# Patient Record
Sex: FEMALE | Race: WHITE | URBAN - METROPOLITAN AREA
[De-identification: names, ages, dates, MRNs, and addresses within clinical notes are randomized per-mention and may not be internally consistent; named-entity substitution may affect disease eponyms.]

---

## 2019-01-21 ENCOUNTER — HOSPITAL ENCOUNTER (EMERGENCY)
Age: 59
Discharge: HOME | End: 2019-01-21
Attending: EMERGENCY MEDICINE
Payer: COMMERCIAL

## 2019-01-21 VITALS
TEMPERATURE: 98.2 F | SYSTOLIC BLOOD PRESSURE: 169 MMHG | WEIGHT: 259.92 LBS | BODY MASS INDEX: 41.77 KG/M2 | OXYGEN SATURATION: 96 % | HEIGHT: 66 IN | HEART RATE: 116 BPM | DIASTOLIC BLOOD PRESSURE: 108 MMHG | RESPIRATION RATE: 16 BRPM

## 2019-01-21 DIAGNOSIS — S61.412A LACERATION OF LEFT PALM, INITIAL ENCOUNTER: Primary | ICD-10-CM

## 2019-01-21 PROCEDURE — 99282 EMERGENCY DEPT VISIT SF MDM: CPT

## 2019-01-21 PROCEDURE — 4500000022 HC ED LEVEL 2 PROCEDURE

## 2019-01-21 ASSESSMENT — PAIN SCALES - GENERAL: PAINLEVEL_OUTOF10: 0

## 2024-11-21 ENCOUNTER — OFFICE VISIT (OUTPATIENT)
Dept: ENT CLINIC | Age: 64
End: 2024-11-21
Payer: COMMERCIAL

## 2024-11-21 VITALS
OXYGEN SATURATION: 97 % | BODY MASS INDEX: 36.54 KG/M2 | WEIGHT: 214 LBS | SYSTOLIC BLOOD PRESSURE: 138 MMHG | HEIGHT: 64 IN | DIASTOLIC BLOOD PRESSURE: 87 MMHG | HEART RATE: 102 BPM

## 2024-11-21 DIAGNOSIS — K11.8 MASS OF RIGHT SUBMANDIBULAR GLAND: ICD-10-CM

## 2024-11-21 DIAGNOSIS — H60.8X2 CHRONIC ECZEMATOUS OTITIS EXTERNA OF LEFT EAR: Primary | ICD-10-CM

## 2024-11-21 DIAGNOSIS — Z90.49 HISTORY OF PAROTIDECTOMY: ICD-10-CM

## 2024-11-21 PROCEDURE — 1036F TOBACCO NON-USER: CPT | Performed by: STUDENT IN AN ORGANIZED HEALTH CARE EDUCATION/TRAINING PROGRAM

## 2024-11-21 PROCEDURE — 1123F ACP DISCUSS/DSCN MKR DOCD: CPT | Performed by: STUDENT IN AN ORGANIZED HEALTH CARE EDUCATION/TRAINING PROGRAM

## 2024-11-21 PROCEDURE — 1090F PRES/ABSN URINE INCON ASSESS: CPT | Performed by: STUDENT IN AN ORGANIZED HEALTH CARE EDUCATION/TRAINING PROGRAM

## 2024-11-21 PROCEDURE — G8427 DOCREV CUR MEDS BY ELIG CLIN: HCPCS | Performed by: STUDENT IN AN ORGANIZED HEALTH CARE EDUCATION/TRAINING PROGRAM

## 2024-11-21 PROCEDURE — G8419 CALC BMI OUT NRM PARAM NOF/U: HCPCS | Performed by: STUDENT IN AN ORGANIZED HEALTH CARE EDUCATION/TRAINING PROGRAM

## 2024-11-21 PROCEDURE — 3017F COLORECTAL CA SCREEN DOC REV: CPT | Performed by: STUDENT IN AN ORGANIZED HEALTH CARE EDUCATION/TRAINING PROGRAM

## 2024-11-21 PROCEDURE — 99204 OFFICE O/P NEW MOD 45 MIN: CPT | Performed by: STUDENT IN AN ORGANIZED HEALTH CARE EDUCATION/TRAINING PROGRAM

## 2024-11-21 PROCEDURE — G8484 FLU IMMUNIZE NO ADMIN: HCPCS | Performed by: STUDENT IN AN ORGANIZED HEALTH CARE EDUCATION/TRAINING PROGRAM

## 2024-11-21 PROCEDURE — G8400 PT W/DXA NO RESULTS DOC: HCPCS | Performed by: STUDENT IN AN ORGANIZED HEALTH CARE EDUCATION/TRAINING PROGRAM

## 2024-11-21 RX ORDER — FLUOCINOLONE ACETONIDE 0.11 MG/ML
OIL AURICULAR (OTIC)
Qty: 20 ML | Refills: 0 | Status: SHIPPED | OUTPATIENT
Start: 2024-11-21

## 2024-11-21 NOTE — PROGRESS NOTES
history.    Patient has a history of what sounds like a superficial parotidectomy approximately 15 years ago.  She does not recall what the pathology was, only that it was benign.  She did not have any significant follow-up or repeat imaging.  She now has greater than a year history of a right neck mass anterior to where her previous mass was.  She describes in the submandibular gland region.  She does not feel it is growing.  She has no pain.  No facial nerve weakness or systemic symptoms.          REVIEW OF SYSTEMS  The following systems were reviewed and revealed the following in addition to any already discussed in the HPI:    PHYSICAL EXAM    GENERAL: No acute distress, alert and oriented, no hoarseness, strong voice  EYES: EOMI, Anti-icteric  HENT:   Head: Normocephalic and atraumatic.   Face:  Symmetric, facial nerve intact  Right Ear: Normal external ear, normal external auditory canal, intact tympanic membrane with normal mobility and aerated middle ear  Left Ear: Normal external ear, normal external auditory canal, intact tympanic membrane with normal mobility and aerated middle ear mild inflammation of the distal external auditory canal consistent with chronic eczematous otitis externa  Mouth/Oral Cavity:  normal lips, Uvula is midline, no mucosal lesions, no trismus, unremarkable dentition, normal salivary quality/flow  Oropharynx/Larynx:  normal oropharynx, 1+ tonsils  Nose:Normal external nasal appearance.  Normal mucosa   NECK: Normal range of motion, no thyromegaly, trachea is midline, no lymphadenopathy, no neck masses, no crepitus  CHEST: Normal respiratory effort, no retractions, breathing comfortably  SKIN: No rashes, normal appearing skin, no evidence of skin lesions/tumors  Neuro:  cranial nerve II-XII intact; normal gait  Cardio:  no edema        PROCEDURE      This note was generated completely or in part utilizing Dragon dictation speech recognition software.  Occasionally, words are

## 2025-01-07 NOTE — PROGRESS NOTES
[facility] PRE-OPERATIVE INSTRUCTIONS    Day of Procedure:   1/14/25             Arrival time:  0900              Surgery time: 1000    Take the following medications with a sip of water:  Follow your MD/Surgeons pre-procedure instructions regarding your medications     Do not eat or drink anything after 12:00 midnight prior to your surgery.  This includes water, chewing gum, mints and ice chips.   You may brush your teeth and gargle the morning of your surgery, but do not swallow the water. Except prep     Please see your family doctor/pediatrician for a history and physical and/or concerning medications. N/A   Bring any test results/reports from your physicians office.   If you are under the care of a heart doctor or specialist doctor, please be aware that you may be asked to see them for clearance.    You may be asked to stop blood thinners such as Coumadin, Plavix, Fragmin, Lovenox, etc., or any anti-inflammatories such as:  Aspirin, Ibuprofen, Advil, Naproxen prior to your surgery.    We also ask that you stop any over the counter medications such as fish oil, vitamin E, glucosamine, garlic, Multivitamins, COQ 10, etc.    We ask that you do not smoke 24 hours prior to surgery.  We ask that you do not  drink any alcoholic beverages 24 hours prior to surgery     You must make arrangements for a responsible adult to take you home after your surgery.    For your safety, you will not be allowed to leave alone or drive yourself home.  Your surgery will be cancelled if you do not have a ride home.     Also for your safety, you must have someone stay with you the first 24 hours after your surgery.     A parent or legal guardian must accompany a child scheduled for surgery and plan to stay at the hospital until the child is discharged.    Please do not bring other children with you.    For your comfort, please wear simple loose fitting clothing to the hospital.  Please do not bring valuables.    Do not wear

## 2025-01-14 ENCOUNTER — HOSPITAL ENCOUNTER (OUTPATIENT)
Age: 65
Setting detail: OUTPATIENT SURGERY
Discharge: HOME | End: 2025-01-14
Attending: INTERNAL MEDICINE | Admitting: INTERNAL MEDICINE
Payer: COMMERCIAL

## 2025-01-14 ENCOUNTER — ANESTHESIA EVENT (OUTPATIENT)
Dept: ENDOSCOPY | Age: 65
End: 2025-01-14
Payer: COMMERCIAL

## 2025-01-14 ENCOUNTER — ANESTHESIA (OUTPATIENT)
Dept: ENDOSCOPY | Age: 65
End: 2025-01-14
Payer: COMMERCIAL

## 2025-01-14 ENCOUNTER — APPOINTMENT (OUTPATIENT)
Dept: ENDOSCOPY | Age: 65
End: 2025-01-14
Attending: INTERNAL MEDICINE
Payer: COMMERCIAL

## 2025-01-14 VITALS
HEART RATE: 86 BPM | WEIGHT: 216 LBS | HEIGHT: 64 IN | BODY MASS INDEX: 36.88 KG/M2 | TEMPERATURE: 96.4 F | OXYGEN SATURATION: 95 % | DIASTOLIC BLOOD PRESSURE: 67 MMHG | SYSTOLIC BLOOD PRESSURE: 137 MMHG | RESPIRATION RATE: 16 BRPM

## 2025-01-14 DIAGNOSIS — Z12.11 COLON CANCER SCREENING: ICD-10-CM

## 2025-01-14 LAB
GLUCOSE BLD-MCNC: 174 MG/DL (ref 70–99)
PERFORMED ON: ABNORMAL

## 2025-01-14 PROCEDURE — 3700000000 HC ANESTHESIA ATTENDED CARE: Performed by: INTERNAL MEDICINE

## 2025-01-14 PROCEDURE — 3609010600 HC COLONOSCOPY POLYPECTOMY SNARE/COLD BIOPSY: Performed by: INTERNAL MEDICINE

## 2025-01-14 PROCEDURE — 88305 TISSUE EXAM BY PATHOLOGIST: CPT

## 2025-01-14 PROCEDURE — 7100000011 HC PHASE II RECOVERY - ADDTL 15 MIN: Performed by: INTERNAL MEDICINE

## 2025-01-14 PROCEDURE — 2709999900 HC NON-CHARGEABLE SUPPLY: Performed by: INTERNAL MEDICINE

## 2025-01-14 PROCEDURE — 7100000010 HC PHASE II RECOVERY - FIRST 15 MIN: Performed by: INTERNAL MEDICINE

## 2025-01-14 PROCEDURE — 3700000001 HC ADD 15 MINUTES (ANESTHESIA): Performed by: INTERNAL MEDICINE

## 2025-01-14 PROCEDURE — 6360000002 HC RX W HCPCS

## 2025-01-14 RX ORDER — SODIUM CHLORIDE 9 MG/ML
INJECTION, SOLUTION INTRAVENOUS PRN
Status: CANCELLED | OUTPATIENT
Start: 2025-01-14

## 2025-01-14 RX ORDER — SODIUM CHLORIDE 0.9 % (FLUSH) 0.9 %
5-40 SYRINGE (ML) INJECTION PRN
Status: DISCONTINUED | OUTPATIENT
Start: 2025-01-14 | End: 2025-01-14 | Stop reason: HOSPADM

## 2025-01-14 RX ORDER — NALOXONE HYDROCHLORIDE 0.4 MG/ML
INJECTION, SOLUTION INTRAMUSCULAR; INTRAVENOUS; SUBCUTANEOUS PRN
Status: DISCONTINUED | OUTPATIENT
Start: 2025-01-14 | End: 2025-01-14 | Stop reason: HOSPADM

## 2025-01-14 RX ORDER — SODIUM CHLORIDE 0.9 % (FLUSH) 0.9 %
5-40 SYRINGE (ML) INJECTION EVERY 12 HOURS SCHEDULED
Status: CANCELLED | OUTPATIENT
Start: 2025-01-14

## 2025-01-14 RX ORDER — LIDOCAINE HYDROCHLORIDE 20 MG/ML
INJECTION, SOLUTION EPIDURAL; INFILTRATION; INTRACAUDAL; PERINEURAL
Status: DISCONTINUED | OUTPATIENT
Start: 2025-01-14 | End: 2025-01-14 | Stop reason: SDUPTHER

## 2025-01-14 RX ORDER — PROPOFOL 10 MG/ML
INJECTION, EMULSION INTRAVENOUS
Status: DISCONTINUED | OUTPATIENT
Start: 2025-01-14 | End: 2025-01-14 | Stop reason: SDUPTHER

## 2025-01-14 RX ORDER — NALOXONE HYDROCHLORIDE 0.4 MG/ML
INJECTION, SOLUTION INTRAMUSCULAR; INTRAVENOUS; SUBCUTANEOUS PRN
Status: CANCELLED | OUTPATIENT
Start: 2025-01-14

## 2025-01-14 RX ORDER — SODIUM CHLORIDE 0.9 % (FLUSH) 0.9 %
5-40 SYRINGE (ML) INJECTION PRN
Status: CANCELLED | OUTPATIENT
Start: 2025-01-14

## 2025-01-14 RX ORDER — SODIUM CHLORIDE 0.9 % (FLUSH) 0.9 %
5-40 SYRINGE (ML) INJECTION EVERY 12 HOURS SCHEDULED
Status: DISCONTINUED | OUTPATIENT
Start: 2025-01-14 | End: 2025-01-14 | Stop reason: HOSPADM

## 2025-01-14 RX ORDER — IPRATROPIUM BROMIDE AND ALBUTEROL SULFATE 2.5; .5 MG/3ML; MG/3ML
1 SOLUTION RESPIRATORY (INHALATION)
Status: DISCONTINUED | OUTPATIENT
Start: 2025-01-14 | End: 2025-01-14 | Stop reason: HOSPADM

## 2025-01-14 RX ORDER — SODIUM CHLORIDE 9 MG/ML
INJECTION, SOLUTION INTRAVENOUS PRN
Status: DISCONTINUED | OUTPATIENT
Start: 2025-01-14 | End: 2025-01-14 | Stop reason: HOSPADM

## 2025-01-14 RX ADMIN — LIDOCAINE HYDROCHLORIDE 50 MG: 20 INJECTION, SOLUTION EPIDURAL; INFILTRATION; INTRACAUDAL; PERINEURAL at 11:03

## 2025-01-14 RX ADMIN — PROPOFOL 150 MCG/KG/MIN: 10 INJECTION, EMULSION INTRAVENOUS at 11:04

## 2025-01-14 RX ADMIN — PROPOFOL 80 MG: 10 INJECTION, EMULSION INTRAVENOUS at 11:03

## 2025-01-14 ASSESSMENT — PAIN - FUNCTIONAL ASSESSMENT
PAIN_FUNCTIONAL_ASSESSMENT: NONE - DENIES PAIN
PAIN_FUNCTIONAL_ASSESSMENT: WONG-BAKER FACES

## 2025-01-14 ASSESSMENT — ENCOUNTER SYMPTOMS: SHORTNESS OF BREATH: 0

## 2025-01-14 ASSESSMENT — LIFESTYLE VARIABLES: SMOKING_STATUS: 0

## 2025-01-14 NOTE — ANESTHESIA POSTPROCEDURE EVALUATION
Department of Anesthesiology  Postprocedure Note    Patient: Johana Briseno  MRN: [id number]  YOB: 1960  Date of evaluation: 1/14/2025    Procedure Summary       Date: 01/14/25 Room / Location: [facility] 03 / [facility]    Anesthesia Start: 1100 Anesthesia Stop: 1132    Procedure: COLONOSCOPY POLYPECTOMY SNARE/BIOPSY Diagnosis:       Colon cancer screening      (Colon cancer screening [Z12.11])    Surgeons: Christiane Shen MD Responsible Provider: Brittny Myers MD    Anesthesia Type: MAC ASA Status: 2            Anesthesia Type: MAC    Asia Phase I: Asia Score: 10    Asia Phase II: Asia Score: 5    Anesthesia Post Evaluation    Patient location during evaluation: bedside  Patient participation: complete - patient participated  Level of consciousness: awake and alert  Pain score: 0  Airway patency: patent  Nausea & Vomiting: no vomiting  Cardiovascular status: blood pressure returned to baseline  Respiratory status: acceptable  Hydration status: euvolemic  Pain management: adequate    No notable events documented.

## 2025-01-14 NOTE — H&P
Gastroenterology History and Physical         Patient: Johana Briseno    MRN: [id number]    Sex: female    YOB: 1960    Age: 64 y.o.    Location: [facility]         Date:1/14/2025    Primary Care Physician: Practice, *[facility] YOLANDA Nelson (Inactive)             Patient: Johana Briseno         Physician: Christiane Shen MD    Colon cancer screening [Z12.11]         @[facility]@         Vital Signs: BP (!) 156/93   Pulse (!) 109   Temp 97.4 °F (36.3 °C) (Temporal)   Resp 16   Ht 1.626 m (5' 4\")   Wt 98 kg (216 lb)   SpO2 97%   BMI 37.08 kg/m²          Allergies: No Known Allergies         History of Present Illness: 64 y.o. female  presents for colonoscopy for colon cancer screening and history of colon polyps. Denies first degree relatives with colon cancer.         History:    Past Medical History:   Diagnosis Date    Diabetes mellitus (HCC)     Hyperlipidemia        Past Surgical History:   Procedure Laterality Date    COLONOSCOPY      HYSTERECTOMY (CERVIX STATUS UNKNOWN)         Social History     Socioeconomic History    Marital status: Single     Spouse name: None    Number of children: None    Years of education: None    Highest education level: None   Tobacco Use    Smoking status: Never    Smokeless tobacco: Never   Vaping Use    Vaping status: Never Used   Substance and Sexual Activity    Alcohol use: No    Drug use: No     Social Determinants of Health      Received from [facility] and [facility] [facility]    Food Insecurities    Received from [facility] and [employer] [facility]    Transportation    Received from [facility] and [facility]    Interpersonal Safety    Received from [facility] and [facility]    Housing/Utilities       History reviewed. No pertinent family history.         Medications:   Prior to Admission medications    Medication Sig Start Date End Date Taking? Authorizing Provider   Ascorbic Acid (VITAMIN C PO) Take

## 2025-01-14 NOTE — OP NOTE
Colonoscopy Procedure Note      Patient: Johana Briseno  : 1960  Acct#:     Procedure: Colonoscopy with polypectomy (cold snare)    Date:  2025    Surgeon:  Christiane Shen MD    Referring Physician:  Practice, *[facility] YOLANDA Nelson (Inactive)    Previous Colonoscopy: YES  Date:      Preoperative Diagnosis:    Colon cancer screening  History of colon polyps    Postoperative Diagnosis:    Colon polypsx2  Sigmoid colon diverticulosis  Internal and external hemorrhoids  Perianal abscess    Consent:  The patient or their legal guardian has signed a consent, and is aware of the potential risks, benefits, alternatives, and potential complications of this procedure.  These include, but are not limited to hemorrhage, bleeding, post procedural pain, perforation, phlebitis, aspiration, hypotension, hypoxia, cardiovascular events such as arryhthmia, and possibly death.  Additionally, the possibility of missed colonic polyps and interval colon cancer was discussed in the consent.    Anesthesia: MAC, see anesthesia documentation     Procedure:   An informed consent was obtained from the patient after explanation of indications, benefits, possible risks and complications of the procedure.  The patient was then taken to the endoscopy suite, placed in the left lateral decubitus position, and the above IV anesthesia was administered.    A digital rectal examination was performed and revealed external hemorrhoids noted and actively draining perianal abscess.    The Olympus video colonoscope was placed in the patient's rectum under digital direction and advanced to the cecum. The cecum was identified by characteristic anatomy and ballottment.  The preparation was good.  The ileocecal valve was identified. The procedure was technically difficult due to looping managed with abdominal pressure.       The scope was then withdrawn back through the cecum, ascending, transverse, descending, sigmoid colon, and rectum.

## 2025-01-14 NOTE — ANESTHESIA PRE PROCEDURE
Department of Anesthesiology  Preprocedure Note       Name:  Johana Briseno   Age:  64 y.o.  :  1960                                          MRN:  [id number]         Date:  2025      Surgeon: Surgeon(s):  Christiane Shen MD    Procedure: Procedure(s):  COLORECTAL CANCER SCREENING, NOT HIGH RISK    Medications prior to admission:   Prior to Admission medications    Medication Sig Start Date End Date Taking? Authorizing Provider   Ascorbic Acid (VITAMIN C PO) Take 1 tablet by mouth daily   Yes Erika Diallo MD   Omega-3 Fatty Acids (FISH OIL PO) Take 1 tablet by mouth daily   Yes Erika Diallo MD   fluocinolone (DERMOTIC) 0.01 % OIL oil Apply 5 drops to the affected ear(s) twice daily for 5 days. Allow the solution to sit for several minutes. 24   Jet Benedict MD   atorvastatin (LIPITOR) 40 MG tablet Take 1 tablet by mouth daily 24   Erika Diallo MD   empagliflozin (JARDIANCE) 10 MG tablet Take 1 tablet by mouth every morning 24   Erika Diallo MD   metFORMIN (GLUCOPHAGE) 500 MG tablet Take 2 tablets by mouth 2 times daily (with meals) 24  Erika Diallo MD       Current medications:    No current facility-administered medications for this encounter.       Allergies:  No Known Allergies    Problem List:  There is no problem list on file for this patient.      Past Medical History:        Diagnosis Date    Diabetes mellitus (HCC)     Hyperlipidemia        Past Surgical History:        Procedure Laterality Date    COLONOSCOPY      HYSTERECTOMY (CERVIX STATUS UNKNOWN)         Social History:    Social History     Tobacco Use    Smoking status: Never    Smokeless tobacco: Never   Substance Use Topics    Alcohol use: No                                Counseling given: Not Answered      Vital Signs (Current):   Vitals:    25 1218   Weight: 99.8 kg (220 lb)   Height: 1.626 m (5' 4\")                                              BP

## 2025-02-11 ENCOUNTER — TRANSCRIBE ORDERS (OUTPATIENT)
Dept: ADMINISTRATIVE | Age: 65
End: 2025-02-11

## 2025-02-11 DIAGNOSIS — Z13.820 SCREENING FOR OSTEOPOROSIS: Primary | ICD-10-CM

## 2025-02-13 ENCOUNTER — HOSPITAL ENCOUNTER (OUTPATIENT)
Dept: CT IMAGING | Age: 65
Discharge: HOME | End: 2025-02-13
Attending: STUDENT IN AN ORGANIZED HEALTH CARE EDUCATION/TRAINING PROGRAM
Payer: COMMERCIAL

## 2025-02-13 DIAGNOSIS — K11.8 MASS OF RIGHT SUBMANDIBULAR GLAND: ICD-10-CM

## 2025-02-13 LAB
PERFORMED ON: NORMAL
POC CREATININE: 0.9 MG/DL (ref 0.6–1.2)
POC SAMPLE TYPE: NORMAL

## 2025-02-13 PROCEDURE — 6360000004 HC RX CONTRAST MEDICATION: Performed by: STUDENT IN AN ORGANIZED HEALTH CARE EDUCATION/TRAINING PROGRAM

## 2025-02-13 PROCEDURE — 70491 CT SOFT TISSUE NECK W/DYE: CPT

## 2025-02-13 PROCEDURE — 82565 ASSAY OF CREATININE: CPT

## 2025-02-13 RX ORDER — IOPAMIDOL 755 MG/ML
75 INJECTION, SOLUTION INTRAVASCULAR
Status: COMPLETED | OUTPATIENT
Start: 2025-02-13 | End: 2025-02-13

## 2025-02-13 RX ADMIN — IOPAMIDOL 75 ML: 755 INJECTION, SOLUTION INTRAVENOUS at 07:29

## 2025-03-19 ENCOUNTER — OFFICE VISIT (OUTPATIENT)
Dept: ENT CLINIC | Age: 65
End: 2025-03-19
Payer: COMMERCIAL

## 2025-03-19 ENCOUNTER — HOSPITAL ENCOUNTER (OUTPATIENT)
Dept: WOMENS IMAGING | Age: 65
Discharge: HOME | End: 2025-03-19
Payer: COMMERCIAL

## 2025-03-19 VITALS
BODY MASS INDEX: 37.9 KG/M2 | OXYGEN SATURATION: 97 % | HEIGHT: 64 IN | DIASTOLIC BLOOD PRESSURE: 89 MMHG | SYSTOLIC BLOOD PRESSURE: 133 MMHG | HEART RATE: 118 BPM | WEIGHT: 222 LBS

## 2025-03-19 DIAGNOSIS — H60.8X2 CHRONIC ECZEMATOUS OTITIS EXTERNA OF LEFT EAR: Primary | ICD-10-CM

## 2025-03-19 DIAGNOSIS — Z90.49 HISTORY OF PAROTIDECTOMY: ICD-10-CM

## 2025-03-19 DIAGNOSIS — Z13.820 SCREENING FOR OSTEOPOROSIS: ICD-10-CM

## 2025-03-19 PROCEDURE — 1090F PRES/ABSN URINE INCON ASSESS: CPT | Performed by: STUDENT IN AN ORGANIZED HEALTH CARE EDUCATION/TRAINING PROGRAM

## 2025-03-19 PROCEDURE — 3017F COLORECTAL CA SCREEN DOC REV: CPT | Performed by: STUDENT IN AN ORGANIZED HEALTH CARE EDUCATION/TRAINING PROGRAM

## 2025-03-19 PROCEDURE — G8427 DOCREV CUR MEDS BY ELIG CLIN: HCPCS | Performed by: STUDENT IN AN ORGANIZED HEALTH CARE EDUCATION/TRAINING PROGRAM

## 2025-03-19 PROCEDURE — G8400 PT W/DXA NO RESULTS DOC: HCPCS | Performed by: STUDENT IN AN ORGANIZED HEALTH CARE EDUCATION/TRAINING PROGRAM

## 2025-03-19 PROCEDURE — 1123F ACP DISCUSS/DSCN MKR DOCD: CPT | Performed by: STUDENT IN AN ORGANIZED HEALTH CARE EDUCATION/TRAINING PROGRAM

## 2025-03-19 PROCEDURE — 99213 OFFICE O/P EST LOW 20 MIN: CPT | Performed by: STUDENT IN AN ORGANIZED HEALTH CARE EDUCATION/TRAINING PROGRAM

## 2025-03-19 PROCEDURE — 1036F TOBACCO NON-USER: CPT | Performed by: STUDENT IN AN ORGANIZED HEALTH CARE EDUCATION/TRAINING PROGRAM

## 2025-03-19 PROCEDURE — 77080 DXA BONE DENSITY AXIAL: CPT

## 2025-03-19 PROCEDURE — G8417 CALC BMI ABV UP PARAM F/U: HCPCS | Performed by: STUDENT IN AN ORGANIZED HEALTH CARE EDUCATION/TRAINING PROGRAM

## 2025-03-19 NOTE — PROGRESS NOTES
follow-up for issues related to right neck mass and chronic eczematous otitis externa.  No swelling of the right neck area.      REVIEW OF SYSTEMS  The following systems were reviewed and revealed the following in addition to any already discussed in the HPI:    PHYSICAL EXAM    GENERAL: No acute distress, alert and oriented, no hoarseness, strong voice  EYES: EOMI, Anti-icteric  HENT:   Head: Normocephalic and atraumatic.   Face:  Symmetric, facial nerve intact  Right Ear: Normal external ear, normal external auditory canal, intact tympanic membrane with normal mobility and aerated middle ear  Left Ear: Normal external ear, normal external auditory canal, intact tympanic membrane with normal mobility and aerated middle ear improvement of inflammation of the external auditory canal  Mouth/Oral Cavity:  normal lips, Uvula is midline, no mucosal lesions, no trismus, unremarkable dentition, normal salivary quality/flow  Oropharynx/Larynx:  normal oropharynx, 1+ tonsils  Nose:Normal external nasal appearance.  Normal mucosa   NECK: Normal range of motion, no thyromegaly, trachea is midline, no lymphadenopathy, no neck masses, no crepitus  CHEST: Normal respiratory effort, no retractions, breathing comfortably  SKIN: No rashes, normal appearing skin, no evidence of skin lesions/tumors  Neuro:  cranial nerve II-XII intact; normal gait  Cardio:  no edema        PROCEDURE      This note was generated completely or in part utilizing Dragon dictation speech recognition software.  Occasionally, words are mistranscribed and despite editing, the text may contain inaccuracies due to incorrect word recognition.  If further clarification is needed please contact the office at [phone].    An electronic signature was used to authenticate this note.    --Jet Benedict MD

## (undated) DEVICE — SNARE ENDOSCP POLYP 2.4 MM 240 CM 10 MM 2.8 MM CAPTIVATOR